# Patient Record
Sex: FEMALE
[De-identification: names, ages, dates, MRNs, and addresses within clinical notes are randomized per-mention and may not be internally consistent; named-entity substitution may affect disease eponyms.]

---

## 2021-10-25 PROBLEM — Z00.00 ENCOUNTER FOR PREVENTIVE HEALTH EXAMINATION: Status: ACTIVE | Noted: 2021-10-25

## 2021-10-28 ENCOUNTER — APPOINTMENT (OUTPATIENT)
Dept: NEUROSURGERY | Facility: CLINIC | Age: 51
End: 2021-10-28
Payer: COMMERCIAL

## 2021-10-28 ENCOUNTER — TRANSCRIPTION ENCOUNTER (OUTPATIENT)
Age: 51
End: 2021-10-28

## 2021-10-28 VITALS
DIASTOLIC BLOOD PRESSURE: 98 MMHG | WEIGHT: 110 LBS | OXYGEN SATURATION: 100 % | HEIGHT: 62 IN | BODY MASS INDEX: 20.24 KG/M2 | HEART RATE: 128 BPM | SYSTOLIC BLOOD PRESSURE: 166 MMHG

## 2021-10-28 DIAGNOSIS — M53.3 SACROCOCCYGEAL DISORDERS, NOT ELSEWHERE CLASSIFIED: ICD-10-CM

## 2021-10-28 DIAGNOSIS — Z82.49 FAMILY HISTORY OF ISCHEMIC HEART DISEASE AND OTHER DISEASES OF THE CIRCULATORY SYSTEM: ICD-10-CM

## 2021-10-28 PROCEDURE — 99205 OFFICE O/P NEW HI 60 MIN: CPT

## 2021-10-28 RX ORDER — DESVENLAFAXINE 100 MG/1
TABLET, EXTENDED RELEASE ORAL
Refills: 0 | Status: ACTIVE | COMMUNITY

## 2021-10-28 RX ORDER — DESVENLAFAXINE SUCCINATE 100 MG/1
100 TABLET, EXTENDED RELEASE ORAL
Refills: 0 | Status: ACTIVE | COMMUNITY

## 2021-10-28 RX ORDER — MULTIVIT/FOLIC ACID/HERBAL 223 400 MCG
TABLET ORAL
Refills: 0 | Status: ACTIVE | COMMUNITY

## 2021-10-28 NOTE — REVIEW OF SYSTEMS
[Negative] : Heme/Lymph [Tingling] : tingling [Sleep Disturbances] : sleep disturbances [Anxiety] : anxiety [Constipation] : constipation [Diarrhea] : diarrhea [Hot Flashes] : hot flashes

## 2021-10-28 NOTE — REASON FOR VISIT
[New Patient Visit] : a new patient visit [Referred By: _________] : Patient was referred by DAMIEN [Family Member] : family member

## 2021-11-04 NOTE — HISTORY OF PRESENT ILLNESS
[FreeTextEntry1] : "SCIATIC PAIN, CYST ON SACRUM [de-identified] : Katie Tsai is a pleasant right handed 51 year old lady who presents for consultation regarding right sciatic pain that has been present intermittent for years.  Pain increased over the past few weeks and she followed up with her PCP.  Pain medications did not relieve pain.  She describes sensation as pressure more than pain.  She also reports jarred shifting positions relieve pressure at times.\par \par She denies bowel and bladder problems, weakness in lower extremities.  Pt states that she fell and broke her tailbone about 25 years ago.  Fell backwards off a bar stool.\par \par She admits that she drinks Vodka on a daily basis - about 4 glasses per day.  She had a recent ER visit after falling while intoxicated and presents with a black eye.

## 2021-11-04 NOTE — ASSESSMENT
[FreeTextEntry1] : IMPRESSION:\par 1. 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 Explained that surgical intervention on Tarlov cyst is not recommended and will most likely refill with CSF and tissue may not be yield any diagnostic tissue.\par 2. Explained that there is usually not benefit from operating on Tarlov cysts as risks outweigh benefits.\par 3. F/U with Dr. Irwin for biopsy.\par 4. MRI sacrum/coccyx in 3 months.\par \par
